# Patient Record
Sex: FEMALE | Race: WHITE | NOT HISPANIC OR LATINO | Employment: OTHER | URBAN - METROPOLITAN AREA
[De-identification: names, ages, dates, MRNs, and addresses within clinical notes are randomized per-mention and may not be internally consistent; named-entity substitution may affect disease eponyms.]

---

## 2023-03-21 ENCOUNTER — NURSING HOME VISIT (OUTPATIENT)
Dept: POST ACUTE CARE | Facility: EXTERNAL LOCATION | Age: 88
End: 2023-03-21
Payer: MEDICARE

## 2023-03-21 DIAGNOSIS — F03.C0 SEVERE DEMENTIA, UNSPECIFIED DEMENTIA TYPE, UNSPECIFIED WHETHER BEHAVIORAL, PSYCHOTIC, OR MOOD DISTURBANCE OR ANXIETY (MULTI): Primary | ICD-10-CM

## 2023-03-21 DIAGNOSIS — F32.A ANXIETY AND DEPRESSION: ICD-10-CM

## 2023-03-21 DIAGNOSIS — D64.9 ANEMIA, UNSPECIFIED TYPE: ICD-10-CM

## 2023-03-21 DIAGNOSIS — F41.9 ANXIETY AND DEPRESSION: ICD-10-CM

## 2023-03-21 DIAGNOSIS — G91.2 NORMAL PRESSURE HYDROCEPHALUS (MULTI): ICD-10-CM

## 2023-03-21 PROCEDURE — 99348 HOME/RES VST EST LOW MDM 30: CPT | Performed by: EMERGENCY MEDICINE

## 2023-03-21 NOTE — LETTER
Patient: Anthony Bennett  : 1935    Encounter Date: 2023    Anthony Bennett   88 y.o.  female  @location@        Not on File    Assessment and Plan:  87-year-old     [facility]     Nausea vomiting diarrhea mostly secondary to right distal ureteral stone with hydronephrosis  IV fluid n.p.o. urology consult  Patient underwent lithotripsy and placement of stent-stent to be removed later     Continue IV Rocephin  Follow-up on urine culture  Pain management     Hypokalemia consider replacement mild anemia  Lovenox for DVT prophylaxis  Depression anxiety continue Zoloft Remeron  GERD continue PPI     Has been cleared by urology for discharge  CT brain showed hydrocephalus  Neurosurgery recommended nuclear medicine cisternogram which patient does not want to do  Had an episode of dizziness and nausea earlier today which has been occurring at her assisted living  She does have some dementia and appears to have depression  Can be discharged     Colace 100 mg oral capsule 100 mg = 1 caps, ORAL, BID  docusate-senna 50 mg-8.6 mg oral tablet 1 tabs, ORAL, DAILY  Mapap 325 mg oral tablet 650 mg = 2 tabs, PRN, ORAL, [id number]  meclizine 25 mg oral tablet 25 mg = 1 tabs, PRN, ORAL, [id number]  Melatonin 3 mg oral tablet 3 mg = 1 tabs, ORAL, QHS  PriLOSEC OTC 20 mg oral delayed release tablet 20 mg = 1 tabs, ORAL, DAILY  Remeron 15 mg oral tablet 15 mg = 1 tabs, ORAL, DAILY  Tessalon Perles 100 mg oral capsule 100 mg = 1 caps, PRN, ORAL, [id number]  Thera-M oral tablet 1 tabs, ORAL, DAILY  Vitamin B12 1000 mcg oral tablet 1,000 mcg = 1 tabs, ORAL, DAILY  Vitamin D3 25 mcg (1000 intl units) oral tablet 25 mcg = 1 tabs, ORAL, DAILY  Zoloft 25 mg oral tablet 75 mg = 3 tabs, ORAL, DAILY     -Fall prevention     -Cognitive engagement     -Monitor and treat blood pressure     -Aggressive decubitus ulcer prevention.     -Bowel and bladder care     -Optimal nutrition and supplementation as needed     -GI and DVT prophylaxis      -Symptom control     -Ambulation as tolerated     -Will follow      Charting was completed using electronic voice recognition technology and may have unintended errors which may not have been completely corrected.  Source of history: Nurse, Medical personnel, Medical record, Patient.  History limitation: None.    HPI:    No current outpatient medications on file.     No current facility-administered medications for this visit.     87-year-old lady  Patient is unable to give detailed history and therefore history is obtained from the chart     Prior History from hospitalization-  87-year-old female presented to ED with complaining of nausea vomiting abdominal pain. Patient is a poor historian secondary to dementia patient had a work-up in ER her lab work did not reveal any acute abnormality UA showed large leuk esterase and positive RBC white cell count and few bacteria. Patient had a chest x-ray done did not reveal any acute abnormality. Patient had a CT abdomen pelvis without contrast on showed moderate right hydronephrosis due to obstructing stone at ureteropelvic junction with mild dilation of proximal ureter distal to the stone. The patient was given IV antibiotic IV fluid pain medication and was admitted for further evaluation  Procedure history: GB   Social History   Social history negative.      Health Status    Include (Selected)   Allergic Reactions (All)  Severity Not Documented  Flagyl- No reactions were documented.  Lopressor- No reactions were documented.  Zofran- No reactions were documented..        No visits with results within 2 Month(s) from this visit.   Latest known visit with results is:   No results found for any previous visit.       Physical Exam:  Vital signs as per nursing/MA documentation  General appearance: Alert and in no acute distress  HEENT: Normal Inspection  Neck - Normal Inspection  Respiratory : No respiratory distress. Lungs are clear   Cardiovascular: heart rate normal. No  gallop  Back - normal inspection  Skin inspection:Warm  Musculoskeletal : No deformities  Neuro : Limited exam. Baseline    ROS: Review of symptoms is negative except for what is mentioned in HPI    No results found.    No family history on file.    Social History     Socioeconomic History   • Marital status:      Spouse name: Not on file   • Number of children: Not on file   • Years of education: Not on file   • Highest education level: Not on file   Occupational History   • Not on file   Tobacco Use   • Smoking status: Not on file   • Smokeless tobacco: Not on file   Vaping Use   • Vaping status: Not on file   Substance and Sexual Activity   • Alcohol use: Not on file   • Drug use: Not on file   • Sexual activity: Not on file   Other Topics Concern   • Not on file   Social History Narrative   • Not on file     Social Determinants of Health     Financial Resource Strain: Not on file   Food Insecurity: Not on file   Transportation Needs: Not on file   Physical Activity: Not on file   Stress: Not on file   Social Connections: Not on file   Intimate Partner Violence: Not on file   Housing Stability: Not on file       Past Medical History:   Diagnosis Date   • Personal history of other diseases of the circulatory system 09/23/2016    History of coronary atherosclerosis   • Personal history of other diseases of the circulatory system 09/23/2016    History of hypertension   • Personal history of other diseases of the digestive system 09/23/2016    History of esophageal reflux   • Personal history of other diseases of the musculoskeletal system and connective tissue 09/23/2016    History of osteoporosis       No past surgical history on file.      Charting was completed using voice recognition technology and may include unintended errors.    Discussed with patient/family, RN, and NP.      Electronically Signed By: Jose D Bush MD   4/4/23  4:22 PM

## 2023-04-04 PROBLEM — D64.9 ANEMIA: Status: ACTIVE | Noted: 2023-04-04

## 2023-04-04 PROBLEM — G91.2 NORMAL PRESSURE HYDROCEPHALUS (MULTI): Status: ACTIVE | Noted: 2023-04-04

## 2023-04-04 PROBLEM — F41.9 ANXIETY AND DEPRESSION: Status: ACTIVE | Noted: 2023-04-04

## 2023-04-04 PROBLEM — F03.C0 SEVERE DEMENTIA (MULTI): Status: ACTIVE | Noted: 2023-04-04

## 2023-04-04 PROBLEM — F32.A ANXIETY AND DEPRESSION: Status: ACTIVE | Noted: 2023-04-04

## 2023-04-04 NOTE — PROGRESS NOTES
Anthony Bennett   88 y.o.  female  @location@        Not on File    Assessment and Plan:  87-year-old     [facility]     Nausea vomiting diarrhea mostly secondary to right distal ureteral stone with hydronephrosis  IV fluid n.p.o. urology consult  Patient underwent lithotripsy and placement of stent-stent to be removed later     Continue IV Rocephin  Follow-up on urine culture  Pain management     Hypokalemia consider replacement mild anemia  Lovenox for DVT prophylaxis  Depression anxiety continue Zoloft Remeron  GERD continue PPI     Has been cleared by urology for discharge  CT brain showed hydrocephalus  Neurosurgery recommended nuclear medicine cisternogram which patient does not want to do  Had an episode of dizziness and nausea earlier today which has been occurring at her assisted living  She does have some dementia and appears to have depression  Can be discharged     Colace 100 mg oral capsule 100 mg = 1 caps, ORAL, BID  docusate-senna 50 mg-8.6 mg oral tablet 1 tabs, ORAL, DAILY  Mapap 325 mg oral tablet 650 mg = 2 tabs, PRN, ORAL, [id number]  meclizine 25 mg oral tablet 25 mg = 1 tabs, PRN, ORAL, [id number]  Melatonin 3 mg oral tablet 3 mg = 1 tabs, ORAL, QHS  PriLOSEC OTC 20 mg oral delayed release tablet 20 mg = 1 tabs, ORAL, DAILY  Remeron 15 mg oral tablet 15 mg = 1 tabs, ORAL, DAILY  Tessalon Perles 100 mg oral capsule 100 mg = 1 caps, PRN, ORAL, [id number]  Thera-M oral tablet 1 tabs, ORAL, DAILY  Vitamin B12 1000 mcg oral tablet 1,000 mcg = 1 tabs, ORAL, DAILY  Vitamin D3 25 mcg (1000 intl units) oral tablet 25 mcg = 1 tabs, ORAL, DAILY  Zoloft 25 mg oral tablet 75 mg = 3 tabs, ORAL, DAILY     -Fall prevention     -Cognitive engagement     -Monitor and treat blood pressure     -Aggressive decubitus ulcer prevention.     -Bowel and bladder care     -Optimal nutrition and supplementation as needed     -GI and DVT prophylaxis     -Symptom control     -Ambulation as tolerated     -Will follow      Charting was  completed using electronic voice recognition technology and may have unintended errors which may not have been completely corrected.  Source of history: Nurse, Medical personnel, Medical record, Patient.  History limitation: None.    HPI:    No current outpatient medications on file.     No current facility-administered medications for this visit.     87-year-old lady  Patient is unable to give detailed history and therefore history is obtained from the chart     Prior History from hospitalization-  87-year-old female presented to ED with complaining of nausea vomiting abdominal pain. Patient is a poor historian secondary to dementia patient had a work-up in ER her lab work did not reveal any acute abnormality UA showed large leuk esterase and positive RBC white cell count and few bacteria. Patient had a chest x-ray done did not reveal any acute abnormality. Patient had a CT abdomen pelvis without contrast on showed moderate right hydronephrosis due to obstructing stone at ureteropelvic junction with mild dilation of proximal ureter distal to the stone. The patient was given IV antibiotic IV fluid pain medication and was admitted for further evaluation  Procedure history: GB   Social History   Social history negative.      Health Status    Include (Selected)   Allergic Reactions (All)  Severity Not Documented  Flagyl- No reactions were documented.  Lopressor- No reactions were documented.  Zofran- No reactions were documented..        No visits with results within 2 Month(s) from this visit.   Latest known visit with results is:   No results found for any previous visit.       Physical Exam:  Vital signs as per nursing/MA documentation  General appearance: Alert and in no acute distress  HEENT: Normal Inspection  Neck - Normal Inspection  Respiratory : No respiratory distress. Lungs are clear   Cardiovascular: heart rate normal. No gallop  Back - normal inspection  Skin inspection:Warm  Musculoskeletal : No  deformities  Neuro : Limited exam. Baseline    ROS: Review of symptoms is negative except for what is mentioned in HPI    No results found.    No family history on file.    Social History     Socioeconomic History    Marital status:      Spouse name: Not on file    Number of children: Not on file    Years of education: Not on file    Highest education level: Not on file   Occupational History    Not on file   Tobacco Use    Smoking status: Not on file    Smokeless tobacco: Not on file   Vaping Use    Vaping status: Not on file   Substance and Sexual Activity    Alcohol use: Not on file    Drug use: Not on file    Sexual activity: Not on file   Other Topics Concern    Not on file   Social History Narrative    Not on file     Social Determinants of Health     Financial Resource Strain: Not on file   Food Insecurity: Not on file   Transportation Needs: Not on file   Physical Activity: Not on file   Stress: Not on file   Social Connections: Not on file   Intimate Partner Violence: Not on file   Housing Stability: Not on file       Past Medical History:   Diagnosis Date    Personal history of other diseases of the circulatory system 09/23/2016    History of coronary atherosclerosis    Personal history of other diseases of the circulatory system 09/23/2016    History of hypertension    Personal history of other diseases of the digestive system 09/23/2016    History of esophageal reflux    Personal history of other diseases of the musculoskeletal system and connective tissue 09/23/2016    History of osteoporosis       No past surgical history on file.      Charting was completed using voice recognition technology and may include unintended errors.    Discussed with patient/family, RN, and NP.

## 2023-04-18 ENCOUNTER — NURSING HOME VISIT (OUTPATIENT)
Dept: POST ACUTE CARE | Facility: EXTERNAL LOCATION | Age: 88
End: 2023-04-18
Payer: MEDICARE

## 2023-04-18 DIAGNOSIS — G91.2 NORMAL PRESSURE HYDROCEPHALUS (MULTI): ICD-10-CM

## 2023-04-18 DIAGNOSIS — D64.9 ANEMIA, UNSPECIFIED TYPE: ICD-10-CM

## 2023-04-18 DIAGNOSIS — F32.4 MAJOR DEPRESSIVE DISORDER IN PARTIAL REMISSION, UNSPECIFIED WHETHER RECURRENT (CMS-HCC): ICD-10-CM

## 2023-04-18 DIAGNOSIS — F03.B0 MODERATE DEMENTIA, UNSPECIFIED DEMENTIA TYPE, UNSPECIFIED WHETHER BEHAVIORAL, PSYCHOTIC, OR MOOD DISTURBANCE OR ANXIETY (MULTI): Primary | ICD-10-CM

## 2023-04-18 PROCEDURE — 99349 HOME/RES VST EST MOD MDM 40: CPT | Performed by: EMERGENCY MEDICINE

## 2023-04-18 NOTE — LETTER
Patient: Anthony Bennett  : 1935    Encounter Date: 2023    Anthony Bennett   88 y.o.  female  @location@        Not on File    Assessment and Plan:  87-year-old     [facility]     Nausea vomiting diarrhea mostly secondary to right distal ureteral stone with hydronephrosis  IV fluid n.p.o. urology consult  Patient underwent lithotripsy and placement of stent-stent to be removed later     Continue IV Rocephin  Follow-up on urine culture  Pain management     Hypokalemia consider replacement mild anemia  Lovenox for DVT prophylaxis  Depression anxiety continue Zoloft Remeron  GERD continue PPI     CT brain showed hydrocephalus  Neurosurgery recommended nuclear medicine cisternogram which patient does not want to do   episode of dizziness and nausea earlier today which has been occurring at her assisted living  She does have some dementia and appears to have depression       Colace 100 mg oral capsule 100 mg = 1 caps, ORAL, BID  docusate-senna 50 mg-8.6 mg oral tablet 1 tabs, ORAL, DAILY  Mapap 325 mg oral tablet 650 mg = 2 tabs, PRN, ORAL, [id number]  meclizine 25 mg oral tablet 25 mg = 1 tabs, PRN, ORAL, [id number]  Melatonin 3 mg oral tablet 3 mg = 1 tabs, ORAL, QHS  PriLOSEC OTC 20 mg oral delayed release tablet 20 mg = 1 tabs, ORAL, DAILY  Remeron 15 mg oral tablet 15 mg = 1 tabs, ORAL, DAILY  Tessalon Perles 100 mg oral capsule 100 mg = 1 caps, PRN, ORAL, [id number]  Thera-M oral tablet 1 tabs, ORAL, DAILY  Vitamin B12 1000 mcg oral tablet 1,000 mcg = 1 tabs, ORAL, DAILY  Vitamin D3 25 mcg (1000 intl units) oral tablet 25 mcg = 1 tabs, ORAL, DAILY  Zoloft 25 mg oral tablet 75 mg = 3 tabs, ORAL, DAILY     Provide safe environment for the patient     Continue current medication regimen     OT PT and speech therapy     Bowel and bladder,skin care     Nutritional support     monitor and treat blood glucose     GI  and DVT prophylaxis     PRN medications     Periodic lab work    Regular Follow up     Charting was  completed using electronic voice recognition technology and may have unintended errors which may not have been completely corrected.  Source of history: Nurse, Medical personnel, Medical record, Patient.  History limitation: None.    HPI:    No current outpatient medications on file.     No current facility-administered medications for this visit.     87-year-old lady  Patient is unable to give detailed history and therefore history is obtained from the chart     Prior History from hospitalization-  87-year-old female presented to ED with complaining of nausea vomiting abdominal pain. Patient is a poor historian secondary to dementia patient had a work-up in ER her lab work did not reveal any acute abnormality UA showed large leuk esterase and positive RBC white cell count and few bacteria. Patient had a chest x-ray done did not reveal any acute abnormality. Patient had a CT abdomen pelvis without contrast on showed moderate right hydronephrosis due to obstructing stone at ureteropelvic junction with mild dilation of proximal ureter distal to the stone. The patient was given IV antibiotic IV fluid pain medication and was admitted for further evaluation  Procedure history: GB   Social History   Social history negative.      Health Status    Include (Selected)   Allergic Reactions (All)  Severity Not Documented  Flagyl- No reactions were documented.  Lopressor- No reactions were documented.  Zofran- No reactions were documented..        No visits with results within 2 Month(s) from this visit.   Latest known visit with results is:   No results found for any previous visit.       Physical Exam:  Vital signs as per nursing/MA documentation  General appearance: Alert and in no acute distress  HEENT: Normal Inspection  Neck - Normal Inspection  Respiratory : No respiratory distress. Lungs are clear   Cardiovascular: heart rate normal. No gallop  Back - normal inspection  Skin inspection:Warm  Musculoskeletal : No  deformities  Neuro : Limited exam. Baseline    ROS: Review of symptoms is negative except for what is mentioned in HPI    No results found.    No family history on file.    Social History     Socioeconomic History   • Marital status:      Spouse name: Not on file   • Number of children: Not on file   • Years of education: Not on file   • Highest education level: Not on file   Occupational History   • Not on file   Tobacco Use   • Smoking status: Not on file   • Smokeless tobacco: Not on file   Vaping Use   • Vaping status: Not on file   Substance and Sexual Activity   • Alcohol use: Not on file   • Drug use: Not on file   • Sexual activity: Not on file   Other Topics Concern   • Not on file   Social History Narrative   • Not on file     Social Determinants of Health     Financial Resource Strain: Not on file   Food Insecurity: Not on file   Transportation Needs: Not on file   Physical Activity: Not on file   Stress: Not on file   Social Connections: Not on file   Intimate Partner Violence: Not on file   Housing Stability: Not on file       Past Medical History:   Diagnosis Date   • Personal history of other diseases of the circulatory system 09/23/2016    History of coronary atherosclerosis   • Personal history of other diseases of the circulatory system 09/23/2016    History of hypertension   • Personal history of other diseases of the digestive system 09/23/2016    History of esophageal reflux   • Personal history of other diseases of the musculoskeletal system and connective tissue 09/23/2016    History of osteoporosis       No past surgical history on file.      Charting was completed using voice recognition technology and may include unintended errors.    Discussed with patient/family, RN, and NP.      Electronically Signed By: Jose D Bush MD   4/22/23  7:57 AM

## 2023-04-22 NOTE — PROGRESS NOTES
Anthony Bennett   88 y.o.  female  @location@        Not on File    Assessment and Plan:  87-year-old     [facility]     Nausea vomiting diarrhea mostly secondary to right distal ureteral stone with hydronephrosis  IV fluid n.p.o. urology consult  Patient underwent lithotripsy and placement of stent-stent to be removed later     Continue IV Rocephin  Follow-up on urine culture  Pain management     Hypokalemia consider replacement mild anemia  Lovenox for DVT prophylaxis  Depression anxiety continue Zoloft Remeron  GERD continue PPI     CT brain showed hydrocephalus  Neurosurgery recommended nuclear medicine cisternogram which patient does not want to do   episode of dizziness and nausea earlier today which has been occurring at her assisted living  She does have some dementia and appears to have depression       Colace 100 mg oral capsule 100 mg = 1 caps, ORAL, BID  docusate-senna 50 mg-8.6 mg oral tablet 1 tabs, ORAL, DAILY  Mapap 325 mg oral tablet 650 mg = 2 tabs, PRN, ORAL, [id number]  meclizine 25 mg oral tablet 25 mg = 1 tabs, PRN, ORAL, [id number]  Melatonin 3 mg oral tablet 3 mg = 1 tabs, ORAL, QHS  PriLOSEC OTC 20 mg oral delayed release tablet 20 mg = 1 tabs, ORAL, DAILY  Remeron 15 mg oral tablet 15 mg = 1 tabs, ORAL, DAILY  Tessalon Perles 100 mg oral capsule 100 mg = 1 caps, PRN, ORAL, [id number]  Thera-M oral tablet 1 tabs, ORAL, DAILY  Vitamin B12 1000 mcg oral tablet 1,000 mcg = 1 tabs, ORAL, DAILY  Vitamin D3 25 mcg (1000 intl units) oral tablet 25 mcg = 1 tabs, ORAL, DAILY  Zoloft 25 mg oral tablet 75 mg = 3 tabs, ORAL, DAILY     Provide safe environment for the patient     Continue current medication regimen     OT PT and speech therapy     Bowel and bladder,skin care     Nutritional support     monitor and treat blood glucose     GI  and DVT prophylaxis     PRN medications     Periodic lab work    Regular Follow up     Charting was completed using electronic voice recognition technology and may have  unintended errors which may not have been completely corrected.  Source of history: Nurse, Medical personnel, Medical record, Patient.  History limitation: None.    HPI:    No current outpatient medications on file.     No current facility-administered medications for this visit.     87-year-old lady  Patient is unable to give detailed history and therefore history is obtained from the chart     Prior History from hospitalization-  87-year-old female presented to ED with complaining of nausea vomiting abdominal pain. Patient is a poor historian secondary to dementia patient had a work-up in ER her lab work did not reveal any acute abnormality UA showed large leuk esterase and positive RBC white cell count and few bacteria. Patient had a chest x-ray done did not reveal any acute abnormality. Patient had a CT abdomen pelvis without contrast on showed moderate right hydronephrosis due to obstructing stone at ureteropelvic junction with mild dilation of proximal ureter distal to the stone. The patient was given IV antibiotic IV fluid pain medication and was admitted for further evaluation  Procedure history: GB   Social History   Social history negative.      Health Status    Include (Selected)   Allergic Reactions (All)  Severity Not Documented  Flagyl- No reactions were documented.  Lopressor- No reactions were documented.  Zofran- No reactions were documented..        No visits with results within 2 Month(s) from this visit.   Latest known visit with results is:   No results found for any previous visit.       Physical Exam:  Vital signs as per nursing/MA documentation  General appearance: Alert and in no acute distress  HEENT: Normal Inspection  Neck - Normal Inspection  Respiratory : No respiratory distress. Lungs are clear   Cardiovascular: heart rate normal. No gallop  Back - normal inspection  Skin inspection:Warm  Musculoskeletal : No deformities  Neuro : Limited exam. Baseline    ROS: Review of symptoms is  negative except for what is mentioned in HPI    No results found.    No family history on file.    Social History     Socioeconomic History    Marital status:      Spouse name: Not on file    Number of children: Not on file    Years of education: Not on file    Highest education level: Not on file   Occupational History    Not on file   Tobacco Use    Smoking status: Not on file    Smokeless tobacco: Not on file   Vaping Use    Vaping status: Not on file   Substance and Sexual Activity    Alcohol use: Not on file    Drug use: Not on file    Sexual activity: Not on file   Other Topics Concern    Not on file   Social History Narrative    Not on file     Social Determinants of Health     Financial Resource Strain: Not on file   Food Insecurity: Not on file   Transportation Needs: Not on file   Physical Activity: Not on file   Stress: Not on file   Social Connections: Not on file   Intimate Partner Violence: Not on file   Housing Stability: Not on file       Past Medical History:   Diagnosis Date    Personal history of other diseases of the circulatory system 09/23/2016    History of coronary atherosclerosis    Personal history of other diseases of the circulatory system 09/23/2016    History of hypertension    Personal history of other diseases of the digestive system 09/23/2016    History of esophageal reflux    Personal history of other diseases of the musculoskeletal system and connective tissue 09/23/2016    History of osteoporosis       No past surgical history on file.      Charting was completed using voice recognition technology and may include unintended errors.    Discussed with patient/family, RN, and NP.

## 2023-05-09 ENCOUNTER — NURSING HOME VISIT (OUTPATIENT)
Dept: POST ACUTE CARE | Facility: EXTERNAL LOCATION | Age: 88
End: 2023-05-09
Payer: MEDICARE

## 2023-05-09 DIAGNOSIS — G91.2 NORMAL PRESSURE HYDROCEPHALUS (MULTI): Primary | ICD-10-CM

## 2023-05-09 DIAGNOSIS — F03.C0 SEVERE DEMENTIA, UNSPECIFIED DEMENTIA TYPE, UNSPECIFIED WHETHER BEHAVIORAL, PSYCHOTIC, OR MOOD DISTURBANCE OR ANXIETY (MULTI): ICD-10-CM

## 2023-05-09 DIAGNOSIS — D64.9 ANEMIA, UNSPECIFIED TYPE: ICD-10-CM

## 2023-05-09 DIAGNOSIS — F32.A ANXIETY AND DEPRESSION: ICD-10-CM

## 2023-05-09 DIAGNOSIS — F41.9 ANXIETY AND DEPRESSION: ICD-10-CM

## 2023-05-09 PROCEDURE — 99349 HOME/RES VST EST MOD MDM 40: CPT | Performed by: EMERGENCY MEDICINE

## 2023-05-09 NOTE — LETTER
Patient: Anthony Benntet  : 1935    Encounter Date: 2023    Anthony Bennett   88 y.o.  female  @location@        Not on File    Assessment and Plan:  87-year-old     [facility]     Nausea vomiting diarrhea mostly secondary to right distal ureteral stone with hydronephrosis  IV fluid n.p.o. urology consult  Patient underwent lithotripsy and placement of stent-stent to be removed later     Continue IV Rocephin  Follow-up on urine culture  Pain management     Hypokalemia consider replacement mild anemia  Lovenox for DVT prophylaxis  Depression anxiety continue Zoloft Remeron  GERD continue PPI     CT brain showed hydrocephalus  Neurosurgery recommended nuclear medicine cisternogram which patient does not want to do   episode of dizziness and nausea earlier today which has been occurring at her assisted living  She does have some dementia and appears to have depression       Colace 100 mg oral capsule 100 mg = 1 caps, ORAL, BID  docusate-senna 50 mg-8.6 mg oral tablet 1 tabs, ORAL, DAILY  Mapap 325 mg oral tablet 650 mg = 2 tabs, PRN, ORAL, [id number]  meclizine 25 mg oral tablet 25 mg = 1 tabs, PRN, ORAL, [id number]  Melatonin 3 mg oral tablet 3 mg = 1 tabs, ORAL, QHS  PriLOSEC OTC 20 mg oral delayed release tablet 20 mg = 1 tabs, ORAL, DAILY  Remeron 15 mg oral tablet 15 mg = 1 tabs, ORAL, DAILY  Tessalon Perles 100 mg oral capsule 100 mg = 1 caps, PRN, ORAL, [id number]  Thera-M oral tablet 1 tabs, ORAL, DAILY  Vitamin B12 1000 mcg oral tablet 1,000 mcg = 1 tabs, ORAL, DAILY  Vitamin D3 25 mcg (1000 intl units) oral tablet 25 mcg = 1 tabs, ORAL, DAILY  Zoloft 25 mg oral tablet 75 mg = 3 tabs, ORAL, DAILY     1. medications are reviewed      2. Continue with rehabilitative, supportive, and or restorative care as ordered and as the patient tolerates     3. Laboratory evaluations will be monitored on an ongoing as needed basis     4. Medications have been cross-referenced with the patient's diagnoses list, and  medications reductions have been considered and/or implemented.     5. Pharmacy recommendations are addressed on an ongoing as needed basis.     6. Controlled substances have been electronically scripted every 60 days for opiates and others of similar schedule, and every 6 months for sedative/hypnotics and others of similar schedule.     7. Nursing has been queried about any potential adverse events that need to be reported to me.    Salient information and adjustment of care plan pertaining to this individual patient interaction today are the following:      A. We will continue with restorative and supportive care as the patient tolerates    B. Laboratory examinations will continue to be drawn on an ongoing as-needed basis. The patient's weight needs to be monitored and if needed we may need to institute appetite stimulating medication    C. The patient's long term prognosis is guarded    Charting is done using voice recognition software and may contain errors which may not have been completely corrected    Source of history: Nurse, Medical personnel, Medical record, Patient.  History limitation: None.    HPI:    No current outpatient medications on file.     No current facility-administered medications for this visit.     87-year-old lady  Patient is unable to give detailed history and therefore history is obtained from the chart     Prior History from hospitalization-  87-year-old female presented to ED with complaining of nausea vomiting abdominal pain. Patient is a poor historian secondary to dementia patient had a work-up in ER her lab work did not reveal any acute abnormality UA showed large leuk esterase and positive RBC white cell count and few bacteria. Patient had a chest x-ray done did not reveal any acute abnormality. Patient had a CT abdomen pelvis without contrast on showed moderate right hydronephrosis due to obstructing stone at ureteropelvic junction with mild dilation of proximal ureter distal to  the stone. The patient was given IV antibiotic IV fluid pain medication and was admitted for further evaluation  Procedure history: GB   Social History   Social history negative.      Health Status    Include (Selected)   Allergic Reactions (All)  Severity Not Documented  Flagyl- No reactions were documented.  Lopressor- No reactions were documented.  Zofran- No reactions were documented..        No visits with results within 2 Month(s) from this visit.   Latest known visit with results is:   No results found for any previous visit.       Physical Exam:  Vital signs as per nursing/MA documentation  General appearance: Alert and in no acute distress  HEENT: Normal Inspection  Neck - Normal Inspection  Respiratory : No respiratory distress. Lungs are clear   Cardiovascular: heart rate normal. No gallop  Back - normal inspection  Skin inspection:Warm  Musculoskeletal : No deformities  Neuro : Limited exam. Baseline    ROS: Review of symptoms is negative except for what is mentioned in HPI    No results found.    No family history on file.    Social History     Socioeconomic History   • Marital status:      Spouse name: Not on file   • Number of children: Not on file   • Years of education: Not on file   • Highest education level: Not on file   Occupational History   • Not on file   Tobacco Use   • Smoking status: Not on file   • Smokeless tobacco: Not on file   Vaping Use   • Vaping status: Not on file   Substance and Sexual Activity   • Alcohol use: Not on file   • Drug use: Not on file   • Sexual activity: Not on file   Other Topics Concern   • Not on file   Social History Narrative   • Not on file     Social Determinants of Health     Financial Resource Strain: Not on file   Food Insecurity: Not on file   Transportation Needs: Not on file   Physical Activity: Not on file   Stress: Not on file   Social Connections: Not on file   Intimate Partner Violence: Not on file   Housing Stability: Not on file       Past  Medical History:   Diagnosis Date   • Personal history of other diseases of the circulatory system 09/23/2016    History of coronary atherosclerosis   • Personal history of other diseases of the circulatory system 09/23/2016    History of hypertension   • Personal history of other diseases of the digestive system 09/23/2016    History of esophageal reflux   • Personal history of other diseases of the musculoskeletal system and connective tissue 09/23/2016    History of osteoporosis       No past surgical history on file.      Charting was completed using voice recognition technology and may include unintended errors.    Discussed with patient/family, RN, and NP.      Electronically Signed By: Jose D Bush MD   5/13/23  9:10 AM

## 2023-05-13 NOTE — PROGRESS NOTES
Anthony Bennett   88 y.o.  female  @location@        Not on File    Assessment and Plan:  87-year-old     [facility]     Nausea vomiting diarrhea mostly secondary to right distal ureteral stone with hydronephrosis  IV fluid n.p.o. urology consult  Patient underwent lithotripsy and placement of stent-stent to be removed later     Continue IV Rocephin  Follow-up on urine culture  Pain management     Hypokalemia consider replacement mild anemia  Lovenox for DVT prophylaxis  Depression anxiety continue Zoloft Remeron  GERD continue PPI     CT brain showed hydrocephalus  Neurosurgery recommended nuclear medicine cisternogram which patient does not want to do   episode of dizziness and nausea earlier today which has been occurring at her assisted living  She does have some dementia and appears to have depression       Colace 100 mg oral capsule 100 mg = 1 caps, ORAL, BID  docusate-senna 50 mg-8.6 mg oral tablet 1 tabs, ORAL, DAILY  Mapap 325 mg oral tablet 650 mg = 2 tabs, PRN, ORAL, [id number]  meclizine 25 mg oral tablet 25 mg = 1 tabs, PRN, ORAL, [id number]  Melatonin 3 mg oral tablet 3 mg = 1 tabs, ORAL, QHS  PriLOSEC OTC 20 mg oral delayed release tablet 20 mg = 1 tabs, ORAL, DAILY  Remeron 15 mg oral tablet 15 mg = 1 tabs, ORAL, DAILY  Tessalon Perles 100 mg oral capsule 100 mg = 1 caps, PRN, ORAL, [id number]  Thera-M oral tablet 1 tabs, ORAL, DAILY  Vitamin B12 1000 mcg oral tablet 1,000 mcg = 1 tabs, ORAL, DAILY  Vitamin D3 25 mcg (1000 intl units) oral tablet 25 mcg = 1 tabs, ORAL, DAILY  Zoloft 25 mg oral tablet 75 mg = 3 tabs, ORAL, DAILY     1. medications are reviewed      2. Continue with rehabilitative, supportive, and or restorative care as ordered and as the patient tolerates     3. Laboratory evaluations will be monitored on an ongoing as needed basis     4. Medications have been cross-referenced with the patient's diagnoses list, and medications reductions have been considered and/or implemented.     5. Pharmacy  recommendations are addressed on an ongoing as needed basis.     6. Controlled substances have been electronically scripted every 60 days for opiates and others of similar schedule, and every 6 months for sedative/hypnotics and others of similar schedule.     7. Nursing has been queried about any potential adverse events that need to be reported to me.    Salient information and adjustment of care plan pertaining to this individual patient interaction today are the following:      A. We will continue with restorative and supportive care as the patient tolerates    B. Laboratory examinations will continue to be drawn on an ongoing as-needed basis. The patient's weight needs to be monitored and if needed we may need to institute appetite stimulating medication    C. The patient's long term prognosis is guarded    Charting is done using voice recognition software and may contain errors which may not have been completely corrected    Source of history: Nurse, Medical personnel, Medical record, Patient.  History limitation: None.    HPI:    No current outpatient medications on file.     No current facility-administered medications for this visit.     87-year-old lady  Patient is unable to give detailed history and therefore history is obtained from the chart     Prior History from hospitalization-  87-year-old female presented to ED with complaining of nausea vomiting abdominal pain. Patient is a poor historian secondary to dementia patient had a work-up in ER her lab work did not reveal any acute abnormality UA showed large leuk esterase and positive RBC white cell count and few bacteria. Patient had a chest x-ray done did not reveal any acute abnormality. Patient had a CT abdomen pelvis without contrast on showed moderate right hydronephrosis due to obstructing stone at ureteropelvic junction with mild dilation of proximal ureter distal to the stone. The patient was given IV antibiotic IV fluid pain medication and was  admitted for further evaluation  Procedure history: GB   Social History   Social history negative.      Health Status    Include (Selected)   Allergic Reactions (All)  Severity Not Documented  Flagyl- No reactions were documented.  Lopressor- No reactions were documented.  Zofran- No reactions were documented..        No visits with results within 2 Month(s) from this visit.   Latest known visit with results is:   No results found for any previous visit.       Physical Exam:  Vital signs as per nursing/MA documentation  General appearance: Alert and in no acute distress  HEENT: Normal Inspection  Neck - Normal Inspection  Respiratory : No respiratory distress. Lungs are clear   Cardiovascular: heart rate normal. No gallop  Back - normal inspection  Skin inspection:Warm  Musculoskeletal : No deformities  Neuro : Limited exam. Baseline    ROS: Review of symptoms is negative except for what is mentioned in HPI    No results found.    No family history on file.    Social History     Socioeconomic History    Marital status:      Spouse name: Not on file    Number of children: Not on file    Years of education: Not on file    Highest education level: Not on file   Occupational History    Not on file   Tobacco Use    Smoking status: Not on file    Smokeless tobacco: Not on file   Vaping Use    Vaping status: Not on file   Substance and Sexual Activity    Alcohol use: Not on file    Drug use: Not on file    Sexual activity: Not on file   Other Topics Concern    Not on file   Social History Narrative    Not on file     Social Determinants of Health     Financial Resource Strain: Not on file   Food Insecurity: Not on file   Transportation Needs: Not on file   Physical Activity: Not on file   Stress: Not on file   Social Connections: Not on file   Intimate Partner Violence: Not on file   Housing Stability: Not on file       Past Medical History:   Diagnosis Date    Personal history of other diseases of the circulatory  system 09/23/2016    History of coronary atherosclerosis    Personal history of other diseases of the circulatory system 09/23/2016    History of hypertension    Personal history of other diseases of the digestive system 09/23/2016    History of esophageal reflux    Personal history of other diseases of the musculoskeletal system and connective tissue 09/23/2016    History of osteoporosis       No past surgical history on file.      Charting was completed using voice recognition technology and may include unintended errors.    Discussed with patient/family, RN, and NP.

## 2023-06-22 ENCOUNTER — HOME VISIT (OUTPATIENT)
Dept: POST ACUTE CARE | Facility: EXTERNAL LOCATION | Age: 88
End: 2023-06-22
Payer: MEDICARE

## 2023-06-22 DIAGNOSIS — F41.9 ANXIETY AND DEPRESSION: ICD-10-CM

## 2023-06-22 DIAGNOSIS — D64.9 ANEMIA, UNSPECIFIED TYPE: ICD-10-CM

## 2023-06-22 DIAGNOSIS — F32.A ANXIETY AND DEPRESSION: ICD-10-CM

## 2023-06-22 DIAGNOSIS — F03.C0 SEVERE DEMENTIA, UNSPECIFIED DEMENTIA TYPE, UNSPECIFIED WHETHER BEHAVIORAL, PSYCHOTIC, OR MOOD DISTURBANCE OR ANXIETY (MULTI): ICD-10-CM

## 2023-06-22 DIAGNOSIS — G91.2 NORMAL PRESSURE HYDROCEPHALUS (MULTI): Primary | ICD-10-CM

## 2023-06-22 PROCEDURE — 99348 HOME/RES VST EST LOW MDM 30: CPT | Performed by: EMERGENCY MEDICINE

## 2023-07-03 NOTE — PROGRESS NOTES
Anthony Bennett   88 y.o.  female  @location@        Not on File    Assessment and Plan:       [facility]     Nausea vomiting diarrhea mostly secondary to right distal ureteral stone with hydronephrosis  IV fluid n.p.o. urology consult  Patient underwent lithotripsy and placement of stent-stent to be removed later     Continue IV Rocephin  Follow-up on urine culture  Pain management     Hypokalemia consider replacement mild anemia  Lovenox for DVT prophylaxis  Depression anxiety continue Zoloft Remeron  GERD continue PPI     CT brain showed hydrocephalus  Neurosurgery recommended nuclear medicine cisternogram which patient does not want to do   episode of dizziness and nausea earlier today which has been occurring at her assisted living  She does have some dementia and appears to have depression       Colace 100 mg oral capsule 100 mg = 1 caps, ORAL, BID  docusate-senna 50 mg-8.6 mg oral tablet 1 tabs, ORAL, DAILY  Mapap 325 mg oral tablet 650 mg = 2 tabs, PRN, ORAL, [id number]  meclizine 25 mg oral tablet 25 mg = 1 tabs, PRN, ORAL, [id number]  Melatonin 3 mg oral tablet 3 mg = 1 tabs, ORAL, QHS  PriLOSEC OTC 20 mg oral delayed release tablet 20 mg = 1 tabs, ORAL, DAILY  Remeron 15 mg oral tablet 15 mg = 1 tabs, ORAL, DAILY  Tessalon Perles 100 mg oral capsule 100 mg = 1 caps, PRN, ORAL, [id number]  Thera-M oral tablet 1 tabs, ORAL, DAILY  Vitamin B12 1000 mcg oral tablet 1,000 mcg = 1 tabs, ORAL, DAILY  Vitamin D3 25 mcg (1000 intl units) oral tablet 25 mcg = 1 tabs, ORAL, DAILY  Zoloft 25 mg oral tablet 75 mg = 3 tabs, ORAL, DAILY     -Fall prevention    -Cognitive engagement     -Monitor and treat blood pressure     -Aggressive decubitus ulcer prevention.     -Bowel and bladder care     -Optimal nutrition and supplementation as needed     -GI  and DVT prophylaxis     -Symptom control     -Ambulation as tolerated     -Will follow    Charting is done using voice recognition software and may contain errors which have not been  completely corrected      Source of history: Nurse, Medical personnel, Medical record, Patient.  History limitation: None.    HPI:          Patient is unable to give detailed history and therefore history is obtained from the chart     Prior History from hospitalization-   female presented to ED with complaining of nausea vomiting abdominal pain. Patient is a poor historian secondary to dementia patient had a work-up in ER her lab work did not reveal any acute abnormality UA showed large leuk esterase and positive RBC white cell count and few bacteria. Patient had a chest x-ray done did not reveal any acute abnormality. Patient had a CT abdomen pelvis without contrast on showed moderate right hydronephrosis due to obstructing stone at ureteropelvic junction with mild dilation of proximal ureter distal to the stone. The patient was given IV antibiotic IV fluid pain medication and was admitted for further evaluation  Procedure history: GB   Social History   Social history negative.      Health Status    Include (Selected)   Allergic Reactions (All)  Severity Not Documented  Flagyl- No reactions were documented.  Lopressor- No reactions were documented.  Zofran- No reactions were documented..            Physical Exam:  Vital signs as per nursing/MA documentation  General appearance: Alert and in no acute distress  HEENT: Normal Inspection  Neck - Normal Inspection  Respiratory : No respiratory distress. Lungs are clear   Cardiovascular: heart rate normal. No gallop  Back - normal inspection  Skin inspection:Warm  Musculoskeletal : No deformities  Neuro : Limited exam. Baseline    ROS: Review of symptoms is negative except for what is mentioned in HPI      Past Medical History:   Diagnosis Date    Personal history of other diseases of the circulatory system 09/23/2016    History of coronary atherosclerosis    Personal history of other diseases of the circulatory system 09/23/2016    History of hypertension    Personal  history of other diseases of the digestive system 09/23/2016    History of esophageal reflux    Personal history of other diseases of the musculoskeletal system and connective tissue 09/23/2016    History of osteoporosis       No past surgical history on file.      Charting was completed using voice recognition technology and may include unintended errors.    Discussed with patient/family, RN, and NP.

## 2023-07-18 ENCOUNTER — HOME VISIT (OUTPATIENT)
Dept: POST ACUTE CARE | Facility: EXTERNAL LOCATION | Age: 88
End: 2023-07-18
Payer: MEDICARE

## 2023-07-18 DIAGNOSIS — G91.2 NORMAL PRESSURE HYDROCEPHALUS (MULTI): Primary | ICD-10-CM

## 2023-07-18 DIAGNOSIS — D64.9 ANEMIA, UNSPECIFIED TYPE: ICD-10-CM

## 2023-07-18 DIAGNOSIS — F03.C0 SEVERE DEMENTIA, UNSPECIFIED DEMENTIA TYPE, UNSPECIFIED WHETHER BEHAVIORAL, PSYCHOTIC, OR MOOD DISTURBANCE OR ANXIETY (MULTI): ICD-10-CM

## 2023-07-18 DIAGNOSIS — F32.A ANXIETY AND DEPRESSION: ICD-10-CM

## 2023-07-18 DIAGNOSIS — F41.9 ANXIETY AND DEPRESSION: ICD-10-CM

## 2023-07-18 PROCEDURE — 99349 HOME/RES VST EST MOD MDM 40: CPT | Performed by: EMERGENCY MEDICINE

## 2023-07-25 NOTE — PROGRESS NOTES
Anthony Bennett   88 y.o.  female  @location@        Not on File    Assessment and Plan:       [facility]     Nausea vomiting diarrhea mostly secondary to right distal ureteral stone with hydronephrosis  IV fluid n.p.o. urology consult  Patient underwent lithotripsy and placement of stent-stent to be removed later     Continue IV Rocephin  Follow-up on urine culture  Pain management     Hypokalemia consider replacement mild anemia  Lovenox for DVT prophylaxis  Depression anxiety continue Zoloft Remeron  GERD continue PPI     CT brain showed hydrocephalus  Neurosurgery recommended nuclear medicine cisternogram which patient does not want to do   episode of dizziness and nausea earlier today which has been occurring at her assisted living  She does have some dementia and appears to have depression       Colace 100 mg oral capsule 100 mg = 1 caps, ORAL, BID  docusate-senna 50 mg-8.6 mg oral tablet 1 tabs, ORAL, DAILY  Mapap 325 mg oral tablet 650 mg = 2 tabs, PRN, ORAL, [id number]  meclizine 25 mg oral tablet 25 mg = 1 tabs, PRN, ORAL, [id number]  Melatonin 3 mg oral tablet 3 mg = 1 tabs, ORAL, QHS  PriLOSEC OTC 20 mg oral delayed release tablet 20 mg = 1 tabs, ORAL, DAILY  Remeron 15 mg oral tablet 15 mg = 1 tabs, ORAL, DAILY  Tessalon Perles 100 mg oral capsule 100 mg = 1 caps, PRN, ORAL, [id number]  Thera-M oral tablet 1 tabs, ORAL, DAILY  Vitamin B12 1000 mcg oral tablet 1,000 mcg = 1 tabs, ORAL, DAILY  Vitamin D3 25 mcg (1000 intl units) oral tablet 25 mcg = 1 tabs, ORAL, DAILY  Zoloft 25 mg oral tablet 75 mg = 3 tabs, ORAL, DAILY     Provide safe environment for the patient     Continue current medication regimen     OT PT and speech therapy     Bowel and bladder,skin care     Nutritional support     monitor and treat blood glucose     GI  and DVT prophylaxis     PRN medications     Periodic lab work    Regular Follow up    Charting is done using voice recognition software and may contain errors which have not been  completely corrected        Source of history: Nurse, Medical personnel, Medical record, Patient.  History limitation: None.    HPI:          Patient is unable to give detailed history and therefore history is obtained from the chart     Prior History from hospitalization-   female presented to ED with complaining of nausea vomiting abdominal pain. Patient is a poor historian secondary to dementia patient had a work-up in ER her lab work did not reveal any acute abnormality UA showed large leuk esterase and positive RBC white cell count and few bacteria. Patient had a chest x-ray done did not reveal any acute abnormality. Patient had a CT abdomen pelvis without contrast on showed moderate right hydronephrosis due to obstructing stone at ureteropelvic junction with mild dilation of proximal ureter distal to the stone. The patient was given IV antibiotic IV fluid pain medication and was admitted for further evaluation  Procedure history: GB   Social History   Social history negative.      Health Status    Include (Selected)   Allergic Reactions (All)  Severity Not Documented  Flagyl- No reactions were documented.  Lopressor- No reactions were documented.  Zofran- No reactions were documented..            Physical Exam:  Vital signs as per nursing/MA documentation  General appearance: Alert and in no acute distress  HEENT: Normal Inspection  Neck - Normal Inspection  Respiratory : No respiratory distress. Lungs are clear   Cardiovascular: heart rate normal. No gallop  Back - normal inspection  Skin inspection:Warm  Musculoskeletal : No deformities  Neuro : Limited exam. Baseline    ROS: Review of symptoms is negative except for what is mentioned in HPI      Past Medical History:   Diagnosis Date    Personal history of other diseases of the circulatory system 09/23/2016    History of coronary atherosclerosis    Personal history of other diseases of the circulatory system 09/23/2016    History of hypertension    Personal  history of other diseases of the digestive system 09/23/2016    History of esophageal reflux    Personal history of other diseases of the musculoskeletal system and connective tissue 09/23/2016    History of osteoporosis       No past surgical history on file.      Charting was completed using voice recognition technology and may include unintended errors.    Discussed with patient/family, RN, and NP.

## 2023-08-15 ENCOUNTER — HOME VISIT (OUTPATIENT)
Dept: POST ACUTE CARE | Facility: EXTERNAL LOCATION | Age: 88
End: 2023-08-15
Payer: MEDICARE

## 2023-08-15 DIAGNOSIS — F32.A ANXIETY AND DEPRESSION: ICD-10-CM

## 2023-08-15 DIAGNOSIS — G91.2 NORMAL PRESSURE HYDROCEPHALUS (MULTI): ICD-10-CM

## 2023-08-15 DIAGNOSIS — F41.9 ANXIETY AND DEPRESSION: ICD-10-CM

## 2023-08-15 DIAGNOSIS — D64.9 ANEMIA, UNSPECIFIED TYPE: Primary | ICD-10-CM

## 2023-08-15 DIAGNOSIS — F03.C0 SEVERE DEMENTIA, UNSPECIFIED DEMENTIA TYPE, UNSPECIFIED WHETHER BEHAVIORAL, PSYCHOTIC, OR MOOD DISTURBANCE OR ANXIETY (MULTI): ICD-10-CM

## 2023-08-15 PROCEDURE — 99348 HOME/RES VST EST LOW MDM 30: CPT | Performed by: EMERGENCY MEDICINE

## 2023-08-19 NOTE — PROGRESS NOTES
Anthony Bennett   88 y.o.  female  @location@        Not on File    Assessment and Plan:       [facility]     Nausea vomiting diarrhea mostly secondary to right distal ureteral stone with hydronephrosis  IV fluid n.p.o. urology consult  Patient underwent lithotripsy and placement of stent-stent to be removed later     Continue IV Rocephin  Follow-up on urine culture  Pain management     Hypokalemia consider replacement mild anemia  Lovenox for DVT prophylaxis  Depression anxiety continue Zoloft Remeron  GERD continue PPI     CT brain showed hydrocephalus  Neurosurgery recommended nuclear medicine cisternogram which patient does not want to do   episode of dizziness and nausea earlier today which has been occurring at her assisted living  She does have some dementia and appears to have depression       Colace 100 mg oral capsule 100 mg = 1 caps, ORAL, BID  docusate-senna 50 mg-8.6 mg oral tablet 1 tabs, ORAL, DAILY  Mapap 325 mg oral tablet 650 mg = 2 tabs, PRN, ORAL, [id number]  meclizine 25 mg oral tablet 25 mg = 1 tabs, PRN, ORAL, [id number]  Melatonin 3 mg oral tablet 3 mg = 1 tabs, ORAL, QHS  PriLOSEC OTC 20 mg oral delayed release tablet 20 mg = 1 tabs, ORAL, DAILY  Remeron 15 mg oral tablet 15 mg = 1 tabs, ORAL, DAILY  Tessalon Perles 100 mg oral capsule 100 mg = 1 caps, PRN, ORAL, [id number]  Thera-M oral tablet 1 tabs, ORAL, DAILY  Vitamin B12 1000 mcg oral tablet 1,000 mcg = 1 tabs, ORAL, DAILY  Vitamin D3 25 mcg (1000 intl units) oral tablet 25 mcg = 1 tabs, ORAL, DAILY  Zoloft 25 mg oral tablet 75 mg = 3 tabs, ORAL, DAILY     Provide safe environment for the patient     Rx list reviewed.   PT and OT evaluation is in the process.   Routine safety measures, fall precautions, risk modification and alarm placement if needed for prevention of falls.   Skin care precautions, prevention of pressures sores at pressure points assessed.   Pt needs to be monitored frequently by nursing staff particularly at night time.   Any  confusion, agitation or behavioural disturbance needs to be attended, as per home policy   rapid covid Ag assay need to be done, notify if positive.   If needed appropriate measures to be taken for alarm placements and assisted devices, pt was told not to get up and ambulate at night unless help and assist available at bedside,   labs will be done as per our routine protocol.   PO intake need to be monitored if consuming po.       Charting is done using voice recognition software and may contain errors which have not been completely corrected      Source of history: Nurse, Medical personnel, Medical record, Patient.  History limitation: None.    HPI:          Patient is unable to give detailed history and therefore history is obtained from the chart     Prior History from hospitalization-   female presented to ED with complaining of nausea vomiting abdominal pain. Patient is a poor historian secondary to dementia patient had a work-up in ER her lab work did not reveal any acute abnormality UA showed large leuk esterase and positive RBC white cell count and few bacteria. Patient had a chest x-ray done did not reveal any acute abnormality. Patient had a CT abdomen pelvis without contrast on showed moderate right hydronephrosis due to obstructing stone at ureteropelvic junction with mild dilation of proximal ureter distal to the stone. The patient was given IV antibiotic IV fluid pain medication and was admitted for further evaluation  Procedure history: GB   Social History   Social history negative.      Health Status    Include (Selected)   Allergic Reactions (All)  Severity Not Documented  Flagyl- No reactions were documented.  Lopressor- No reactions were documented.  Zofran- No reactions were documented..            Physical Exam:  Vital signs as per nursing/MA documentation  General appearance: Alert and in no acute distress  HEENT: Normal Inspection  Neck - Normal Inspection  Respiratory : No respiratory  distress. Lungs are clear   Cardiovascular: heart rate normal. No gallop  Back - normal inspection  Skin inspection:Warm  Musculoskeletal : No deformities  Neuro : Limited exam. Baseline    ROS: Review of symptoms is negative except for what is mentioned in HPI      Past Medical History:   Diagnosis Date    Personal history of other diseases of the circulatory system 09/23/2016    History of coronary atherosclerosis    Personal history of other diseases of the circulatory system 09/23/2016    History of hypertension    Personal history of other diseases of the digestive system 09/23/2016    History of esophageal reflux    Personal history of other diseases of the musculoskeletal system and connective tissue 09/23/2016    History of osteoporosis       No past surgical history on file.      Charting was completed using voice recognition technology and may include unintended errors.    Discussed with patient/family, RN, and NP.

## 2023-09-14 ENCOUNTER — HOME VISIT (OUTPATIENT)
Dept: POST ACUTE CARE | Facility: EXTERNAL LOCATION | Age: 88
End: 2023-09-14
Payer: MEDICARE

## 2023-09-14 DIAGNOSIS — F32.A ANXIETY AND DEPRESSION: ICD-10-CM

## 2023-09-14 DIAGNOSIS — F41.9 ANXIETY AND DEPRESSION: ICD-10-CM

## 2023-09-14 DIAGNOSIS — G91.2 NORMAL PRESSURE HYDROCEPHALUS (MULTI): ICD-10-CM

## 2023-09-14 DIAGNOSIS — Z00.00 WELLNESS EXAMINATION: Primary | ICD-10-CM

## 2023-09-14 DIAGNOSIS — F03.C0 SEVERE DEMENTIA, UNSPECIFIED DEMENTIA TYPE, UNSPECIFIED WHETHER BEHAVIORAL, PSYCHOTIC, OR MOOD DISTURBANCE OR ANXIETY (MULTI): ICD-10-CM

## 2023-09-14 DIAGNOSIS — D64.9 ANEMIA, UNSPECIFIED TYPE: ICD-10-CM

## 2023-09-14 PROCEDURE — 99497 ADVNCD CARE PLAN 30 MIN: CPT | Performed by: EMERGENCY MEDICINE

## 2023-09-14 PROCEDURE — G0439 PPPS, SUBSEQ VISIT: HCPCS | Performed by: EMERGENCY MEDICINE

## 2023-09-14 PROCEDURE — 99397 PER PM REEVAL EST PAT 65+ YR: CPT | Performed by: EMERGENCY MEDICINE

## 2023-09-14 PROCEDURE — 99348 HOME/RES VST EST LOW MDM 30: CPT | Performed by: EMERGENCY MEDICINE

## 2023-09-19 NOTE — PROGRESS NOTES
Anthony Bennett   88 y.o.  female  @location@    Annual Medicare wellness and physical    Past Medical, Surgical and Family History: reviewed and updated in chart.   Medications and Supplements: Review of all medications by a prescribing practitioner or clinical pharmacist (such as prescriptions, OTCs, herbal therapies and supplements) documented in the medical record.    No, the patient is not using opioids.   Tobacco use: Non-User The patient has never smoked cigarettes.   Alcohol use: Non-User   Illicit drug use: Non-User   Current diet: well balanced diet.   Exercise Frequency: the patient does not exercise.   Depression/Suicide Screening: Patient has a current diagnosis of depression.    Hearing Impairment: none.   Cognitive Impairment: No cognitive impairment observed.     Bathing: needs assistance.   Dressing: needs assistance.   Walking: needs assistance.   Toileting: needs assistance.   Feeding: needs assistance.   Personal Hygiene: needs assistance.   Managing Finances: needs assistance.   Shopping: needs assistance.   Managing Medications: needs assistance.   Housework / Basic Home Maintenance: needs assistance.   Handling Transportation: needs assistance.   Preparing Meals: needs assistance.   Using the Telephone/ Communication Devices: needs assistance.    Falls Risk Screening:. Patient has not fallen in the last 6 months.   Home safety risk factors: none.   Advance directives:. Advanced Care Planning discussed and documented advance care plan or surrogate decision maker documented in the medical record.   A Conversation about Advance directives of at least 16 minutes has occurred. Actual time spent: 20   Topics discussed: Code status per nursing documentation.   Nursing home/rehabilitation note          Not on File    Assessment and Plan:       [facility]     Nausea vomiting diarrhea mostly secondary to right distal ureteral stone with hydronephrosis  IV fluid n.p.o. urology consult  Patient underwent  lithotripsy and placement of stent-stent to be removed later     Continue IV Rocephin  Follow-up on urine culture  Pain management     Hypokalemia consider replacement mild anemia  Lovenox for DVT prophylaxis  Depression anxiety continue Zoloft Remeron  GERD continue PPI     CT brain showed hydrocephalus  Neurosurgery recommended nuclear medicine cisternogram which patient does not want to do   episode of dizziness and nausea earlier today which has been occurring at her assisted living  She does have some dementia and appears to have depression       Colace 100 mg oral capsule 100 mg = 1 caps, ORAL, BID  docusate-senna 50 mg-8.6 mg oral tablet 1 tabs, ORAL, DAILY  Mapap 325 mg oral tablet 650 mg = 2 tabs, PRN, ORAL, [id number]  meclizine 25 mg oral tablet 25 mg = 1 tabs, PRN, ORAL, [id number]  Melatonin 3 mg oral tablet 3 mg = 1 tabs, ORAL, QHS  PriLOSEC OTC 20 mg oral delayed release tablet 20 mg = 1 tabs, ORAL, DAILY  Remeron 15 mg oral tablet 15 mg = 1 tabs, ORAL, DAILY  Tessalon Perles 100 mg oral capsule 100 mg = 1 caps, PRN, ORAL, [id number]  Thera-M oral tablet 1 tabs, ORAL, DAILY  Vitamin B12 1000 mcg oral tablet 1,000 mcg = 1 tabs, ORAL, DAILY  Vitamin D3 25 mcg (1000 intl units) oral tablet 25 mcg = 1 tabs, ORAL, DAILY  Zoloft 25 mg oral tablet 75 mg = 3 tabs, ORAL, DAILY     Provide safe environment for the patient     1. medications are reviewed      2. Continue with rehabilitative, supportive, and or restorative care as ordered and as the patient tolerates     3. Laboratory evaluations will be monitored on an ongoing as needed basis     4. Medications have been cross-referenced with the patient's diagnoses list, and medications reductions have been considered and/or implemented.     5. Pharmacy recommendations are addressed on an ongoing as needed basis.     6. Controlled substances have been electronically scripted every 60 days for opiates and others of similar schedule, and every 6 months for  sedative/hypnotics and others of similar schedule.     7. Nursing has been queried about any potential adverse events that need to be reported to me.    Salient information and adjustment of care plan pertaining to this individual patient interaction today are the following:      A. We will continue with restorative and supportive care as the patient tolerates    B. Laboratory examinations will continue to be drawn on an ongoing as-needed basis. The patient's weight needs to be monitored and if needed we may need to institute appetite stimulating medication    C. The patient's long term prognosis is guarded    Charting is done using voice recognition software and may contain errors which may not have been completely corrected        Source of history: Nurse, Medical personnel, Medical record, Patient.  History limitation: None.    HPI:          Patient is unable to give detailed history and therefore history is obtained from the chart     Prior History from hospitalization-   female presented to ED with complaining of nausea vomiting abdominal pain. Patient is a poor historian secondary to dementia patient had a work-up in ER her lab work did not reveal any acute abnormality UA showed large leuk esterase and positive RBC white cell count and few bacteria. Patient had a chest x-ray done did not reveal any acute abnormality. Patient had a CT abdomen pelvis without contrast on showed moderate right hydronephrosis due to obstructing stone at ureteropelvic junction with mild dilation of proximal ureter distal to the stone. The patient was given IV antibiotic IV fluid pain medication and was admitted for further evaluation  Procedure history: GB   Social History   Social history negative.      Health Status    Include (Selected)   Allergic Reactions (All)  Severity Not Documented  Flagyl- No reactions were documented.  Lopressor- No reactions were documented.  Zofran- No reactions were documented..            Physical  Exam:  Vital signs as per nursing/MA documentation  General appearance: Alert and in no acute distress  HEENT: Normal Inspection  Neck - Normal Inspection  Respiratory : No respiratory distress. Lungs are clear   Cardiovascular: heart rate normal. No gallop  Back - normal inspection  Skin inspection:Warm  Musculoskeletal : No deformities  Neuro : Limited exam. Baseline    ROS: Review of symptoms is negative except for what is mentioned in HPI      Past Medical History:   Diagnosis Date    Personal history of other diseases of the circulatory system 09/23/2016    History of coronary atherosclerosis    Personal history of other diseases of the circulatory system 09/23/2016    History of hypertension    Personal history of other diseases of the digestive system 09/23/2016    History of esophageal reflux    Personal history of other diseases of the musculoskeletal system and connective tissue 09/23/2016    History of osteoporosis       No past surgical history on file.      Charting was completed using voice recognition technology and may include unintended errors.    Discussed with patient/family, RN, and NP.

## 2023-10-10 ENCOUNTER — HOME VISIT (OUTPATIENT)
Dept: POST ACUTE CARE | Facility: EXTERNAL LOCATION | Age: 88
End: 2023-10-10
Payer: MEDICARE

## 2023-10-10 DIAGNOSIS — F32.A ANXIETY AND DEPRESSION: ICD-10-CM

## 2023-10-10 DIAGNOSIS — F41.9 ANXIETY AND DEPRESSION: ICD-10-CM

## 2023-10-10 DIAGNOSIS — F03.C0 SEVERE DEMENTIA, UNSPECIFIED DEMENTIA TYPE, UNSPECIFIED WHETHER BEHAVIORAL, PSYCHOTIC, OR MOOD DISTURBANCE OR ANXIETY (MULTI): ICD-10-CM

## 2023-10-10 DIAGNOSIS — D64.9 ANEMIA, UNSPECIFIED TYPE: ICD-10-CM

## 2023-10-10 DIAGNOSIS — G91.2 NORMAL PRESSURE HYDROCEPHALUS (MULTI): Primary | ICD-10-CM

## 2023-10-10 PROCEDURE — 99348 HOME/RES VST EST LOW MDM 30: CPT | Performed by: EMERGENCY MEDICINE

## 2023-10-19 NOTE — PROGRESS NOTES
Anthony Bennett   88 y.o.  female  @location@        Not on File    Assessment and Plan:       [facility]     Nausea vomiting diarrhea mostly secondary to right distal ureteral stone with hydronephrosis  IV fluid n.p.o. urology consult  Patient underwent lithotripsy and placement of stent-stent to be removed later     Continue IV Rocephin  Follow-up on urine culture  Pain management     Hypokalemia consider replacement mild anemia  Lovenox for DVT prophylaxis  Depression anxiety continue Zoloft Remeron  GERD continue PPI     CT brain showed hydrocephalus  Neurosurgery recommended nuclear medicine cisternogram which patient does not want to do   episode of dizziness and nausea earlier today which has been occurring at her assisted living  She does have some dementia and appears to have depression       Colace 100 mg oral capsule 100 mg = 1 caps, ORAL, BID  docusate-senna 50 mg-8.6 mg oral tablet 1 tabs, ORAL, DAILY  Mapap 325 mg oral tablet 650 mg = 2 tabs, PRN, ORAL, [id number]  meclizine 25 mg oral tablet 25 mg = 1 tabs, PRN, ORAL, [id number]  Melatonin 3 mg oral tablet 3 mg = 1 tabs, ORAL, QHS  PriLOSEC OTC 20 mg oral delayed release tablet 20 mg = 1 tabs, ORAL, DAILY  Remeron 15 mg oral tablet 15 mg = 1 tabs, ORAL, DAILY  Tessalon Perles 100 mg oral capsule 100 mg = 1 caps, PRN, ORAL, [id number]  Thera-M oral tablet 1 tabs, ORAL, DAILY  Vitamin B12 1000 mcg oral tablet 1,000 mcg = 1 tabs, ORAL, DAILY  Vitamin D3 25 mcg (1000 intl units) oral tablet 25 mcg = 1 tabs, ORAL, DAILY  Zoloft 25 mg oral tablet 75 mg = 3 tabs, ORAL, DAILY     Provide safe environment for the patient     1. medications are reviewed      2. Continue with rehabilitative, supportive, and or restorative care as ordered and as the patient tolerates     3. Laboratory evaluations will be monitored on an ongoing as needed basis     4. Medications have been cross-referenced with the patient's diagnoses list, and medications reductions have been considered  and/or implemented.     5. Pharmacy recommendations are addressed on an ongoing as needed basis.     6. Controlled substances have been electronically scripted every 60 days for opiates and others of similar schedule, and every 6 months for sedative/hypnotics and others of similar schedule.     7. Nursing has been queried about any potential adverse events that need to be reported to me.    Salient information and adjustment of care plan pertaining to this individual patient interaction today are the following:      A. We will continue with restorative and supportive care as the patient tolerates    B. Laboratory examinations will continue to be drawn on an ongoing as-needed basis. The patient's weight needs to be monitored and if needed we may need to institute appetite stimulating medication    C. The patient's long term prognosis is guarded    Charting is done using voice recognition software and may contain errors which may not have been completely corrected        Source of history: Nurse, Medical personnel, Medical record, Patient.  History limitation: None.    HPI:          Patient is unable to give detailed history and therefore history is obtained from the chart     Prior History from hospitalization-   female presented to ED with complaining of nausea vomiting abdominal pain. Patient is a poor historian secondary to dementia patient had a work-up in ER her lab work did not reveal any acute abnormality UA showed large leuk esterase and positive RBC white cell count and few bacteria. Patient had a chest x-ray done did not reveal any acute abnormality. Patient had a CT abdomen pelvis without contrast on showed moderate right hydronephrosis due to obstructing stone at ureteropelvic junction with mild dilation of proximal ureter distal to the stone. The patient was given IV antibiotic IV fluid pain medication and was admitted for further evaluation  Procedure history: GB   Social History   Social history  negative.      Health Status    Include (Selected)   Allergic Reactions (All)  Severity Not Documented  Flagyl- No reactions were documented.  Lopressor- No reactions were documented.  Zofran- No reactions were documented..            Physical Exam:  Vital signs as per nursing/MA documentation  General appearance: Alert and in no acute distress  HEENT: Normal Inspection  Neck - Normal Inspection  Respiratory : No respiratory distress. Lungs are clear   Cardiovascular: heart rate normal. No gallop  Back - normal inspection  Skin inspection:Warm  Musculoskeletal : No deformities  Neuro : Limited exam. Baseline    ROS: Review of symptoms is negative except for what is mentioned in HPI      Past Medical History:   Diagnosis Date    Personal history of other diseases of the circulatory system 09/23/2016    History of coronary atherosclerosis    Personal history of other diseases of the circulatory system 09/23/2016    History of hypertension    Personal history of other diseases of the digestive system 09/23/2016    History of esophageal reflux    Personal history of other diseases of the musculoskeletal system and connective tissue 09/23/2016    History of osteoporosis       No past surgical history on file.      Charting was completed using voice recognition technology and may include unintended errors.    Discussed with patient/family, RN, and NP.

## 2023-11-09 ENCOUNTER — HOME VISIT (OUTPATIENT)
Dept: POST ACUTE CARE | Facility: EXTERNAL LOCATION | Age: 88
End: 2023-11-09
Payer: MEDICARE

## 2023-11-09 DIAGNOSIS — F03.C0 SEVERE DEMENTIA, UNSPECIFIED DEMENTIA TYPE, UNSPECIFIED WHETHER BEHAVIORAL, PSYCHOTIC, OR MOOD DISTURBANCE OR ANXIETY (MULTI): ICD-10-CM

## 2023-11-09 DIAGNOSIS — G91.2 NORMAL PRESSURE HYDROCEPHALUS (MULTI): ICD-10-CM

## 2023-11-09 DIAGNOSIS — F32.A ANXIETY AND DEPRESSION: Primary | ICD-10-CM

## 2023-11-09 DIAGNOSIS — D64.9 ANEMIA, UNSPECIFIED TYPE: ICD-10-CM

## 2023-11-09 DIAGNOSIS — F41.9 ANXIETY AND DEPRESSION: Primary | ICD-10-CM

## 2023-11-09 PROCEDURE — 99349 HOME/RES VST EST MOD MDM 40: CPT | Performed by: EMERGENCY MEDICINE

## 2023-11-10 NOTE — PROGRESS NOTES
Anthony Bennett   88 y.o.  female  @location@        Not on File    Assessment and Plan:    Patient is in the develop and would benefit from therapy secondary to her multiple contractures and disabilities    [facility]     Nausea vomiting diarrhea mostly secondary to right distal ureteral stone with hydronephrosis  IV fluid n.p.o. urology consult  Patient underwent lithotripsy and placement of stent-stent to be removed later     Continue IV Rocephin  Follow-up on urine culture  Pain management     Hypokalemia consider replacement mild anemia  Lovenox for DVT prophylaxis  Depression anxiety continue Zoloft Remeron  GERD continue PPI     CT brain showed hydrocephalus  Neurosurgery recommended nuclear medicine cisternogram which patient does not want to do   episode of dizziness and nausea earlier today which has been occurring at her assisted living  She does have some dementia and appears to have depression       Colace 100 mg oral capsule 100 mg = 1 caps, ORAL, BID  docusate-senna 50 mg-8.6 mg oral tablet 1 tabs, ORAL, DAILY  Mapap 325 mg oral tablet 650 mg = 2 tabs, PRN, ORAL, [id number]  meclizine 25 mg oral tablet 25 mg = 1 tabs, PRN, ORAL, [id number]  Melatonin 3 mg oral tablet 3 mg = 1 tabs, ORAL, QHS  PriLOSEC OTC 20 mg oral delayed release tablet 20 mg = 1 tabs, ORAL, DAILY  Remeron 15 mg oral tablet 15 mg = 1 tabs, ORAL, DAILY  Tessalon Perles 100 mg oral capsule 100 mg = 1 caps, PRN, ORAL, [id number]  Thera-M oral tablet 1 tabs, ORAL, DAILY  Vitamin B12 1000 mcg oral tablet 1,000 mcg = 1 tabs, ORAL, DAILY  Vitamin D3 25 mcg (1000 intl units) oral tablet 25 mcg = 1 tabs, ORAL, DAILY  Zoloft 25 mg oral tablet 75 mg = 3 tabs, ORAL, DAILY     Provide safe environment for the patient     This patient was seen for my regular monthly visit, nursing evaluations and nursing notes were reviewed, interim events are reviewed, interim concerns and messages were reviewed as we have communicated with nursing staff.  Any issues with  the falls, skin care impairment, declining physical condition are reviewed and noted, diagnosis list were reviewed, list of medications were reviewed, living will related issues were reviewed, overall patient has been doing well, any declining in patient's condition or any change in patient's condition needs to be notified to physician promptly, discussed with nursing staff, if needed would communicate with family.  Patient stays confined here at the facility for long-term care, there are always concerns about long-term care related issues and concerns.  Nursing staff is trying their best to keep patient safe, all sort of measures has been taken to keep patient safe and comfortable.       Source of history: Nurse, Medical personnel, Medical record, Patient.  History limitation: None.    HPI:          Patient is unable to give detailed history and therefore history is obtained from the chart     Prior History from hospitalization-   female presented to ED with complaining of nausea vomiting abdominal pain. Patient is a poor historian secondary to dementia patient had a work-up in ER her lab work did not reveal any acute abnormality UA showed large leuk esterase and positive RBC white cell count and few bacteria. Patient had a chest x-ray done did not reveal any acute abnormality. Patient had a CT abdomen pelvis without contrast on showed moderate right hydronephrosis due to obstructing stone at ureteropelvic junction with mild dilation of proximal ureter distal to the stone. The patient was given IV antibiotic IV fluid pain medication and was admitted for further evaluation  Procedure history: GB   Social History   Social history negative.      Health Status    Include (Selected)   Allergic Reactions (All)  Severity Not Documented  Flagyl- No reactions were documented.  Lopressor- No reactions were documented.  Zofran- No reactions were documented..            Physical Exam:  Vital signs as per nursing/MA  documentation  General appearance: Alert and in no acute distress  HEENT: Normal Inspection  Neck - Normal Inspection  Respiratory : No respiratory distress. Lungs are clear   Cardiovascular: heart rate normal. No gallop  Back - normal inspection  Skin inspection:Warm  Musculoskeletal : No deformities  Neuro : Limited exam. Baseline    ROS: Review of symptoms is negative except for what is mentioned in HPI      Past Medical History:   Diagnosis Date    Personal history of other diseases of the circulatory system 09/23/2016    History of coronary atherosclerosis    Personal history of other diseases of the circulatory system 09/23/2016    History of hypertension    Personal history of other diseases of the digestive system 09/23/2016    History of esophageal reflux    Personal history of other diseases of the musculoskeletal system and connective tissue 09/23/2016    History of osteoporosis       No past surgical history on file.      Charting was completed using voice recognition technology and may include unintended errors.    Discussed with patient/family, RN, and NP.

## 2023-12-01 ENCOUNTER — HOME VISIT (OUTPATIENT)
Dept: POST ACUTE CARE | Facility: EXTERNAL LOCATION | Age: 88
End: 2023-12-01
Payer: MEDICARE

## 2023-12-01 DIAGNOSIS — G91.2 NORMAL PRESSURE HYDROCEPHALUS (MULTI): ICD-10-CM

## 2023-12-01 DIAGNOSIS — F03.C0 SEVERE DEMENTIA, UNSPECIFIED DEMENTIA TYPE, UNSPECIFIED WHETHER BEHAVIORAL, PSYCHOTIC, OR MOOD DISTURBANCE OR ANXIETY (MULTI): ICD-10-CM

## 2023-12-01 DIAGNOSIS — D64.9 ANEMIA, UNSPECIFIED TYPE: ICD-10-CM

## 2023-12-01 DIAGNOSIS — F32.A ANXIETY AND DEPRESSION: Primary | ICD-10-CM

## 2023-12-01 DIAGNOSIS — F41.9 ANXIETY AND DEPRESSION: Primary | ICD-10-CM

## 2023-12-01 PROCEDURE — 99348 HOME/RES VST EST LOW MDM 30: CPT | Performed by: EMERGENCY MEDICINE

## 2023-12-09 NOTE — PROGRESS NOTES
Anthony Bennett   88 y.o.  female  @location@        Not on File    Assessment and Plan:    Patient is in the develop and would benefit from therapy secondary to her multiple contractures and disabilities    [facility]     Nausea vomiting diarrhea mostly secondary to right distal ureteral stone with hydronephrosis  IV fluid n.p.o. urology consult  Patient underwent lithotripsy and placement of stent-stent to be removed later     Continue IV Rocephin  Follow-up on urine culture  Pain management     Hypokalemia consider replacement mild anemia  Lovenox for DVT prophylaxis  Depression anxiety continue Zoloft Remeron  GERD continue PPI     CT brain showed hydrocephalus  Neurosurgery recommended nuclear medicine cisternogram which patient does not want to do   episode of dizziness and nausea earlier today which has been occurring at her assisted living  She does have some dementia and appears to have depression       Colace 100 mg oral capsule 100 mg = 1 caps, ORAL, BID  docusate-senna 50 mg-8.6 mg oral tablet 1 tabs, ORAL, DAILY  Mapap 325 mg oral tablet 650 mg = 2 tabs, PRN, ORAL, [id number]  meclizine 25 mg oral tablet 25 mg = 1 tabs, PRN, ORAL, [id number]  Melatonin 3 mg oral tablet 3 mg = 1 tabs, ORAL, QHS  PriLOSEC OTC 20 mg oral delayed release tablet 20 mg = 1 tabs, ORAL, DAILY  Remeron 15 mg oral tablet 15 mg = 1 tabs, ORAL, DAILY  Tessalon Perles 100 mg oral capsule 100 mg = 1 caps, PRN, ORAL, [id number]  Thera-M oral tablet 1 tabs, ORAL, DAILY  Vitamin B12 1000 mcg oral tablet 1,000 mcg = 1 tabs, ORAL, DAILY  Vitamin D3 25 mcg (1000 intl units) oral tablet 25 mcg = 1 tabs, ORAL, DAILY  Zoloft 25 mg oral tablet 75 mg = 3 tabs, ORAL, DAILY     Provide safe environment for the patient     -Fall prevention    -Cognitive engagement     -Monitor and treat blood pressure     -Aggressive decubitus ulcer prevention.     -Bowel and bladder care     -Optimal nutrition and supplementation as needed     -GI  and DVT prophylaxis      -Symptom control     -Ambulation as tolerated     -Will follow    Charting is done using voice recognition software and may contain errors which have not been completely corrected      Source of history: Nurse, Medical personnel, Medical record, Patient.  History limitation: None.    HPI:          Patient is unable to give detailed history and therefore history is obtained from the chart     Prior History from hospitalization-   female presented to ED with complaining of nausea vomiting abdominal pain. Patient is a poor historian secondary to dementia patient had a work-up in ER her lab work did not reveal any acute abnormality UA showed large leuk esterase and positive RBC white cell count and few bacteria. Patient had a chest x-ray done did not reveal any acute abnormality. Patient had a CT abdomen pelvis without contrast on showed moderate right hydronephrosis due to obstructing stone at ureteropelvic junction with mild dilation of proximal ureter distal to the stone. The patient was given IV antibiotic IV fluid pain medication and was admitted for further evaluation  Procedure history: GB   Social History   Social history negative.      Health Status    Include (Selected)   Allergic Reactions (All)  Severity Not Documented  Flagyl- No reactions were documented.  Lopressor- No reactions were documented.  Zofran- No reactions were documented..            Physical Exam:  Vital signs as per nursing/MA documentation  General appearance: Alert and in no acute distress  HEENT: Normal Inspection  Neck - Normal Inspection  Respiratory : No respiratory distress. Lungs are clear   Cardiovascular: heart rate normal. No gallop  Back - normal inspection  Skin inspection:Warm  Musculoskeletal : No deformities  Neuro : Limited exam. Baseline    ROS: Review of symptoms is negative except for what is mentioned in HPI      Past Medical History:   Diagnosis Date    Personal history of other diseases of the circulatory system  09/23/2016    History of coronary atherosclerosis    Personal history of other diseases of the circulatory system 09/23/2016    History of hypertension    Personal history of other diseases of the digestive system 09/23/2016    History of esophageal reflux    Personal history of other diseases of the musculoskeletal system and connective tissue 09/23/2016    History of osteoporosis       No past surgical history on file.      Charting was completed using voice recognition technology and may include unintended errors.    Discussed with patient/family, RN, and NP.

## 2024-01-18 ENCOUNTER — HOME VISIT (OUTPATIENT)
Dept: POST ACUTE CARE | Facility: EXTERNAL LOCATION | Age: 89
End: 2024-01-18
Payer: MEDICARE

## 2024-01-18 DIAGNOSIS — F32.A ANXIETY AND DEPRESSION: ICD-10-CM

## 2024-01-18 DIAGNOSIS — D64.9 ANEMIA, UNSPECIFIED TYPE: ICD-10-CM

## 2024-01-18 DIAGNOSIS — G91.2 NORMAL PRESSURE HYDROCEPHALUS (MULTI): Primary | ICD-10-CM

## 2024-01-18 DIAGNOSIS — F41.9 ANXIETY AND DEPRESSION: ICD-10-CM

## 2024-01-18 DIAGNOSIS — F03.C0 SEVERE DEMENTIA, UNSPECIFIED DEMENTIA TYPE, UNSPECIFIED WHETHER BEHAVIORAL, PSYCHOTIC, OR MOOD DISTURBANCE OR ANXIETY (MULTI): ICD-10-CM

## 2024-01-18 PROCEDURE — 99349 HOME/RES VST EST MOD MDM 40: CPT | Performed by: EMERGENCY MEDICINE

## 2024-02-05 NOTE — PROGRESS NOTES
Anthony Bennett   88 y.o.  female  @location@        Not on File    Assessment and Plan:    Patient is in the develop and would benefit from therapy secondary to her multiple contractures and disabilities    [facility]     Nausea vomiting diarrhea mostly secondary to right distal ureteral stone with hydronephrosis  IV fluid n.p.o. urology consult  Patient underwent lithotripsy and placement of stent-stent to be removed later     Continue IV Rocephin  Follow-up on urine culture  Pain management     Hypokalemia consider replacement mild anemia  Lovenox for DVT prophylaxis  Depression anxiety continue Zoloft Remeron  GERD continue PPI     CT brain showed hydrocephalus  Neurosurgery recommended nuclear medicine cisternogram which patient does not want to do   episode of dizziness and nausea earlier today which has been occurring at her assisted living  She does have some dementia and appears to have depression       Colace 100 mg oral capsule 100 mg = 1 caps, ORAL, BID  docusate-senna 50 mg-8.6 mg oral tablet 1 tabs, ORAL, DAILY  Mapap 325 mg oral tablet 650 mg = 2 tabs, PRN, ORAL, [id number]  meclizine 25 mg oral tablet 25 mg = 1 tabs, PRN, ORAL, [id number]  Melatonin 3 mg oral tablet 3 mg = 1 tabs, ORAL, QHS  PriLOSEC OTC 20 mg oral delayed release tablet 20 mg = 1 tabs, ORAL, DAILY  Remeron 15 mg oral tablet 15 mg = 1 tabs, ORAL, DAILY  Tessalon Perles 100 mg oral capsule 100 mg = 1 caps, PRN, ORAL, [id number]  Thera-M oral tablet 1 tabs, ORAL, DAILY  Vitamin B12 1000 mcg oral tablet 1,000 mcg = 1 tabs, ORAL, DAILY  Vitamin D3 25 mcg (1000 intl units) oral tablet 25 mcg = 1 tabs, ORAL, DAILY  Zoloft 25 mg oral tablet 75 mg = 3 tabs, ORAL, DAILY     Provide safe environment for the patient     Continue current medication regimen     OT PT and speech therapy     Bowel and bladder,skin care     Nutritional support     monitor and treat blood glucose     GI  and DVT prophylaxis     PRN medications     Periodic lab work    Regular  Follow up    Charting is done using voice recognition software and may contain errors which have not been completely corrected        Source of history: Nurse, Medical personnel, Medical record, Patient.  History limitation: None.    HPI:          Patient is unable to give detailed history and therefore history is obtained from the chart     Prior History from hospitalization-   female presented to ED with complaining of nausea vomiting abdominal pain. Patient is a poor historian secondary to dementia patient had a work-up in ER her lab work did not reveal any acute abnormality UA showed large leuk esterase and positive RBC white cell count and few bacteria. Patient had a chest x-ray done did not reveal any acute abnormality. Patient had a CT abdomen pelvis without contrast on showed moderate right hydronephrosis due to obstructing stone at ureteropelvic junction with mild dilation of proximal ureter distal to the stone. The patient was given IV antibiotic IV fluid pain medication and was admitted for further evaluation  Procedure history: GB   Social History   Social history negative.      Health Status    Include (Selected)   Allergic Reactions (All)  Severity Not Documented  Flagyl- No reactions were documented.  Lopressor- No reactions were documented.  Zofran- No reactions were documented..            Physical Exam:  Vital signs as per nursing/MA documentation  General appearance: Alert and in no acute distress  HEENT: Normal Inspection  Neck - Normal Inspection  Respiratory : No respiratory distress. Lungs are clear   Cardiovascular: heart rate normal. No gallop  Back - normal inspection  Skin inspection:Warm  Musculoskeletal : No deformities  Neuro : Limited exam. Baseline    ROS: Review of symptoms is negative except for what is mentioned in HPI      Past Medical History:   Diagnosis Date    Personal history of other diseases of the circulatory system 09/23/2016    History of coronary atherosclerosis     Personal history of other diseases of the circulatory system 09/23/2016    History of hypertension    Personal history of other diseases of the digestive system 09/23/2016    History of esophageal reflux    Personal history of other diseases of the musculoskeletal system and connective tissue 09/23/2016    History of osteoporosis       No past surgical history on file.      Charting was completed using voice recognition technology and may include unintended errors.    Discussed with patient/family, RN, and NP.